# Patient Record
Sex: FEMALE | URBAN - METROPOLITAN AREA
[De-identification: names, ages, dates, MRNs, and addresses within clinical notes are randomized per-mention and may not be internally consistent; named-entity substitution may affect disease eponyms.]

---

## 2020-01-09 ENCOUNTER — OFFICE VISIT (OUTPATIENT)
Dept: FAMILY MEDICINE CLINIC | Facility: CLINIC | Age: 65
End: 2020-01-09
Payer: MEDICARE

## 2020-01-09 VITALS
HEIGHT: 62 IN | SYSTOLIC BLOOD PRESSURE: 124 MMHG | HEART RATE: 80 BPM | WEIGHT: 245 LBS | RESPIRATION RATE: 16 BRPM | DIASTOLIC BLOOD PRESSURE: 74 MMHG | OXYGEN SATURATION: 95 % | TEMPERATURE: 98 F | BODY MASS INDEX: 45.08 KG/M2

## 2020-01-09 DIAGNOSIS — G47.33 OSA (OBSTRUCTIVE SLEEP APNEA): ICD-10-CM

## 2020-01-09 DIAGNOSIS — R06.83 SNORING: Primary | ICD-10-CM

## 2020-01-09 PROCEDURE — 99214 OFFICE O/P EST MOD 30 MIN: CPT | Performed by: FAMILY MEDICINE

## 2020-01-09 RX ORDER — OMEPRAZOLE 40 MG/1
40 CAPSULE, DELAYED RELEASE ORAL DAILY
COMMUNITY
Start: 2019-10-23

## 2020-01-09 RX ORDER — HYDROCODONE BITARTRATE AND ACETAMINOPHEN 5; 325 MG/1; MG/1
TABLET ORAL AS NEEDED
COMMUNITY
Start: 2019-08-28

## 2020-01-09 RX ORDER — TRAMADOL HYDROCHLORIDE 50 MG/1
50 TABLET ORAL AS NEEDED
COMMUNITY
Start: 2019-08-28

## 2020-01-09 RX ORDER — AMLODIPINE BESYLATE 5 MG/1
5 TABLET ORAL DAILY
COMMUNITY
Start: 2020-01-02

## 2020-01-09 RX ORDER — HYDROCHLOROTHIAZIDE 25 MG/1
25 TABLET ORAL
COMMUNITY
Start: 2019-11-06

## 2020-01-09 RX ORDER — OXYBUTYNIN CHLORIDE 10 MG/1
10 TABLET, EXTENDED RELEASE ORAL DAILY
COMMUNITY
Start: 2019-10-01

## 2020-01-09 RX ORDER — METOCLOPRAMIDE 10 MG/1
10 TABLET ORAL 3 TIMES DAILY
COMMUNITY
Start: 2019-12-27

## 2020-01-09 RX ORDER — TRAZODONE HYDROCHLORIDE 100 MG/1
100 TABLET ORAL DAILY
COMMUNITY
Start: 2020-01-04

## 2020-01-09 RX ORDER — BACLOFEN 10 MG/1
10 TABLET ORAL DAILY
COMMUNITY
Start: 2020-01-04

## 2020-01-09 RX ORDER — LEVOTHYROXINE SODIUM 0.2 MG/1
200 TABLET ORAL
COMMUNITY
Start: 2019-10-31

## 2020-01-09 NOTE — PROGRESS NOTES
[facility]  SLEEP PROGRESS NOTE        HPI:   This is a 71year old female coming in for Patient presents with:  Consult: Sleep Consult      HPI: she started bipap in 2013.   She went to get supplies and they would no longer give her supp does not qualify to have social determinant information on file (likely too young).     Social History Narrative      Not on file    Social History    Tobacco Use      Smoking status: Former Smoker        Types: Cigarettes      Smokeless tobacco: Never Used Hematological: Negative. Psychiatric/Behavioral: Positive for sleep disturbance. All other systems reviewed and are negative. EXAM:   /74 (BP Location: Right arm, Patient Position: Sitting, Cuff Size: large)   Pulse 80   Temp 97.8 °F (36. Future    2. MIROSLAVA (obstructive sleep apnea)  - OP REFERRAL TO DIAGNOSTIC SLEEP STUDY  - GENERAL SLEEP STUDY TRANSCRIPTION; Future    There are no Patient Instructions on file for this visit. Pt will need qualifying PSG for medicare criteria.    Recommend

## 2020-01-10 PROBLEM — K31.84 GASTROPARESIS: Status: ACTIVE | Noted: 2019-02-07

## 2020-01-10 PROBLEM — E03.9 HYPOTHYROIDISM: Status: ACTIVE | Noted: 2020-01-10

## 2020-01-10 PROBLEM — M19.90 OSTEOARTHROSIS: Status: ACTIVE | Noted: 2017-05-19

## 2020-01-10 PROBLEM — R53.83 FATIGUE: Status: ACTIVE | Noted: 2017-05-19

## 2020-01-10 PROBLEM — J45.40 MODERATE PERSISTENT ASTHMA WITHOUT COMPLICATION: Status: ACTIVE | Noted: 2017-05-19

## 2020-01-10 PROBLEM — I10 HYPERTENSION: Status: ACTIVE | Noted: 2017-05-19

## 2020-01-23 ENCOUNTER — OFFICE VISIT (OUTPATIENT)
Dept: SLEEP CENTER | Age: 65
End: 2020-01-23
Attending: FAMILY MEDICINE
Payer: MEDICARE

## 2020-01-23 DIAGNOSIS — R06.83 SNORING: ICD-10-CM

## 2020-01-23 DIAGNOSIS — G47.33 OSA (OBSTRUCTIVE SLEEP APNEA): ICD-10-CM

## 2020-01-23 PROCEDURE — 95811 POLYSOM 6/>YRS CPAP 4/> PARM: CPT

## 2020-01-31 ENCOUNTER — TELEPHONE (OUTPATIENT)
Dept: FAMILY MEDICINE CLINIC | Facility: CLINIC | Age: 65
End: 2020-01-31

## 2020-01-31 NOTE — TELEPHONE ENCOUNTER
I have rescheduled the patient for 2/4 at 8 pm to have a repeat sleep study. Will have the staff address appropriately.

## 2020-01-31 NOTE — TELEPHONE ENCOUNTER
----- Message from April N Maryjane sent at 1/30/2020  3:06 PM CST -----  Regarding: Latha Marsh,    I reviewed the split night study performed on 01/23/20 and unfortunately it would appear that the patient was prematurely split.  There were i

## 2020-02-06 ENCOUNTER — OFFICE VISIT (OUTPATIENT)
Dept: SLEEP CENTER | Age: 65
End: 2020-02-06
Attending: FAMILY MEDICINE
Payer: MEDICARE

## 2020-02-06 PROCEDURE — 95810 POLYSOM 6/> YRS 4/> PARAM: CPT

## 2020-02-10 ENCOUNTER — SLEEP STUDY (OUTPATIENT)
Dept: FAMILY MEDICINE CLINIC | Facility: CLINIC | Age: 65
End: 2020-02-10
Payer: MEDICARE

## 2020-02-10 DIAGNOSIS — G47.33 OSA (OBSTRUCTIVE SLEEP APNEA): Primary | ICD-10-CM

## 2020-02-10 PROCEDURE — 95810 POLYSOM 6/> YRS 4/> PARAM: CPT | Performed by: FAMILY MEDICINE

## 2020-02-10 NOTE — PROCEDURES
[address] [address],[facility] [address]       Accredited by the [facility] ([facility])    PATIENT'S NAME:        Garth Lopez  ATTENDING PHYSICIAN:   Buster Osler. MD Anoop  REFERRING PHYSICIAN:   Buster Osler.  Gianna Delcid MD  PATIENT of 7.7. Of note, her REM AHI was 27.3 and a supine AHI of 10.5. The baseline oxygen saturation was 91.2%. The lowest oxygen saturation recorded was 83.4%. LIMB ACTIVITY:  There were 43 limb movements recorded. Of this, 30 were associated with PLMs. Patient Medical History: MIROSLAVA, snoring, EDS  Setup Time began: 8:00   pm  Setup Time ended:  8:30   pm  Lights Out:  9:47      pm  Respiratory Events: hypopneas, mild   Snoring Events: mild   Limb Movements:  mild  Sleep Positions Recorded: supine Obstructive Hypopnea 6 - 45 1 46   Central Hypopnea - - - - -   All Hypopneas 6 40 45 1 46   All Apneas + Hypopneas 7 40 46 1 47   Apnea Index 0.2 - 0.2 - 0.2   AHI 1.4 27.3 10.5 0.5 7.4   RDI 1.4 28.6 11.0 0.5 7.7   Snores - - - - -   Snore Index - - -

## 2020-02-10 NOTE — PROGRESS NOTES
Please notify patient sleep study is read. Update flow sheet   Schedule in office follow up. Sleep hygiene should be review to assess factors that may improve sleep quality.     Weight management and regular exercise should be initiated or continued to opt

## 2020-02-11 ENCOUNTER — TELEPHONE (OUTPATIENT)
Dept: FAMILY MEDICINE CLINIC | Facility: CLINIC | Age: 65
End: 2020-02-11

## 2020-02-11 DIAGNOSIS — G47.33 OSA (OBSTRUCTIVE SLEEP APNEA): Primary | ICD-10-CM

## 2020-02-11 DIAGNOSIS — R53.83 FATIGUE, UNSPECIFIED TYPE: ICD-10-CM

## 2020-02-11 DIAGNOSIS — J45.40 MODERATE PERSISTENT ASTHMA WITHOUT COMPLICATION: ICD-10-CM

## 2020-02-11 NOTE — TELEPHONE ENCOUNTER
LMFP to call back to schedule a Sleep Study FU appointment with Dr. Richard Gee. Updated Flowsheets.     Please place order for BIPAP to [facility].

## 2020-02-11 NOTE — TELEPHONE ENCOUNTER
----- Message from Jagdish Cassidy MD sent at 2/10/2020 11:18 AM CST -----  Please notify patient sleep study is read. Update flow sheet   Schedule in office follow up. Sleep hygiene should be review to assess factors that may improve sleep quality.     [facility]

## 2020-02-12 NOTE — TELEPHONE ENCOUNTER
Pt has upcoming appt scheduled with Nargis.     Future Appointments   Date Time Provider Kyung Mustafa   3/26/2020 10:30 AM [address] [facility] STAS Ross

## 2020-06-25 ENCOUNTER — VIRTUAL PHONE E/M (OUTPATIENT)
Dept: FAMILY MEDICINE CLINIC | Facility: CLINIC | Age: 65
End: 2020-06-25
Payer: MEDICARE

## 2020-06-25 DIAGNOSIS — G47.33 OBSTRUCTIVE SLEEP APNEA (ADULT) (PEDIATRIC): Primary | ICD-10-CM

## 2020-06-25 PROBLEM — E66.9 ADIPOSITY: Status: ACTIVE | Noted: 2017-05-19

## 2020-06-25 PROCEDURE — 99442 PHONE E/M BY PHYS 11-20 MIN: CPT | Performed by: NURSE PRACTITIONER

## 2020-06-25 NOTE — PROGRESS NOTES
[address] [place] [address]  SLEEP PROGRESS NOTE    Parker Kimball  verbally consents to a Virtual/Telephone Check-In service on 6/25/2020.     Patient understands and accepts financial responsibility for any deductible, co-insurance and/or co-pays associa Social History Narrative      Not on file    Social History    Tobacco Use      Smoking status: Former Smoker        Types: Cigarettes      Smokeless tobacco: Never Used      Tobacco comment: Has not smoked for over 10 years    Alcohol use: Yes      Commen is 44.81 kg/m² as calculated from the following:    Height as of 1/9/20: 62\". Weight as of 1/9/20: 245 lb (111.1 kg). Neck in inches:       Wt Readings from Last 6 Encounters:  01/09/20 : 245 lb (111.1 kg)    BP Readings from Last 3 Encounters:  01/09 patient to change filters,masks,hoses  and tubes and equiptment on a  regular schedule.   Filters and seals shall be changed every 1 month,  Hoses every 3 months,   CPAP mask and humidifier  chamber changed every 6 month  with the Durable medical equipment

## 2021-08-10 ENCOUNTER — TELEPHONE (OUTPATIENT)
Dept: FAMILY MEDICINE CLINIC | Facility: CLINIC | Age: 66
End: 2021-08-10

## 2021-08-10 NOTE — TELEPHONE ENCOUNTER
Jo-Ann Roberts  verbally consents to a Virtual/Telephone Check-In service on 8/10/2021. Patient understands and accepts financial responsibility for any deductible, co-insurance and/or co-pays associated with this service.     Future Appointments   Date T

## 2021-08-13 ENCOUNTER — VIRTUAL PHONE E/M (OUTPATIENT)
Dept: FAMILY MEDICINE CLINIC | Facility: CLINIC | Age: 66
End: 2021-08-13
Payer: MEDICARE

## 2021-08-13 DIAGNOSIS — G47.33 OBSTRUCTIVE SLEEP APNEA (ADULT) (PEDIATRIC): Primary | ICD-10-CM

## 2021-08-13 PROCEDURE — 99442 PHONE E/M BY PHYS 11-20 MIN: CPT | Performed by: NURSE PRACTITIONER

## 2021-08-13 RX ORDER — ALBUTEROL SULFATE 90 UG/1
2 AEROSOL, METERED RESPIRATORY (INHALATION) AS NEEDED
COMMUNITY
Start: 2017-05-19

## 2021-08-13 RX ORDER — ERGOCALCIFEROL 1.25 MG/1
50000 CAPSULE ORAL WEEKLY
COMMUNITY
Start: 2021-07-29

## 2021-08-13 RX ORDER — AMOXICILLIN 500 MG/1
2000 CAPSULE ORAL AS DIRECTED
COMMUNITY
Start: 2021-05-28

## 2021-08-13 RX ORDER — BUDESONIDE AND FORMOTEROL FUMARATE DIHYDRATE 80; 4.5 UG/1; UG/1
2 AEROSOL RESPIRATORY (INHALATION) DAILY
COMMUNITY
Start: 2018-06-21

## 2021-08-13 RX ORDER — METFORMIN HYDROCHLORIDE 500 MG/1
500 TABLET, EXTENDED RELEASE ORAL 2 TIMES DAILY
COMMUNITY
Start: 2021-07-09

## 2021-08-13 RX ORDER — ESCITALOPRAM OXALATE 20 MG/1
20 TABLET ORAL DAILY
COMMUNITY
Start: 2021-04-27

## 2021-08-13 RX ORDER — ALPRAZOLAM 0.5 MG/1
0.5 TABLET ORAL 3 TIMES DAILY PRN
COMMUNITY
Start: 2021-07-22

## 2021-08-13 RX ORDER — ROPINIROLE 0.5 MG/1
0.5 TABLET, FILM COATED ORAL NIGHTLY
COMMUNITY
Start: 2021-07-09

## 2021-08-13 RX ORDER — LORATADINE 10 MG/1
10 TABLET ORAL DAILY
COMMUNITY

## 2021-08-13 NOTE — PROGRESS NOTES
[facility]  [facility]        HPI:   This is a 78year old female coming in for Patient presents with:  Obstructive Sleep Apnea (MIROSLAVA)      HPI:     Phone visit time with patient to review her sleep therapy.   She states in genera Smokeless tobacco: Never Used      Tobacco comment: Has not smoked for over 10 years    Alcohol use: Yes      Comment: Rarely    Drug use: Yes      Types: Cannabis    Family History:  History reviewed. No pertinent family history.   Allergies:    Bee Venom Problem List:  Patient Active Problem List:     Hypertension     Moderate persistent asthma without complication     Fatigue     Gastroparesis     Hypothyroidism     Osteoarthrosis     Adiposity     Obstructive sleep apnea (adult) (pediatric)      RE equiptment on a  regular schedule. Filters and seals shall be changed every 1 month,  Hoses every 3 months,   CPAP mask and humidifier  chamber changed every 6 month  with the Durable medical equipment provider.          Advised if still with sleep apnea a

## 2022-10-18 ENCOUNTER — VIRTUAL PHONE E/M (OUTPATIENT)
Dept: FAMILY MEDICINE CLINIC | Facility: CLINIC | Age: 67
End: 2022-10-18
Payer: MEDICARE

## 2022-10-18 DIAGNOSIS — G47.33 OBSTRUCTIVE SLEEP APNEA (ADULT) (PEDIATRIC): Primary | ICD-10-CM

## 2022-10-18 PROBLEM — M17.11 DEGENERATIVE ARTHRITIS OF RIGHT KNEE: Status: ACTIVE | Noted: 2020-12-15

## 2022-10-18 PROBLEM — J44.1 ASTHMA EXACERBATION IN COPD (HCC): Status: ACTIVE | Noted: 2022-01-23

## 2022-10-18 PROBLEM — E11.9 TYPE 2 DIABETES MELLITUS WITHOUT COMPLICATION, WITHOUT LONG-TERM CURRENT USE OF INSULIN (HCC): Status: ACTIVE | Noted: 2021-07-09

## 2022-10-18 PROBLEM — I10 HTN (HYPERTENSION): Status: ACTIVE | Noted: 2017-05-19

## 2022-10-18 PROBLEM — J45.901 ASTHMA EXACERBATION IN COPD (HCC): Status: ACTIVE | Noted: 2022-01-23

## 2022-10-18 PROCEDURE — 99213 OFFICE O/P EST LOW 20 MIN: CPT | Performed by: NURSE PRACTITIONER
